# Patient Record
Sex: FEMALE | URBAN - METROPOLITAN AREA
[De-identification: names, ages, dates, MRNs, and addresses within clinical notes are randomized per-mention and may not be internally consistent; named-entity substitution may affect disease eponyms.]

---

## 2024-08-12 PROCEDURE — 0753T DGTZ GLS MCRSCP SLD LEVEL IV: CPT

## 2024-08-12 PROCEDURE — 88305 TISSUE EXAM BY PATHOLOGIST: CPT

## 2024-08-13 ENCOUNTER — LAB REQUISITION (OUTPATIENT)
Dept: LAB | Facility: HOSPITAL | Age: 54
End: 2024-08-13

## 2024-08-13 DIAGNOSIS — R10.84 GENERALIZED ABDOMINAL PAIN: ICD-10-CM

## 2024-08-19 LAB
LABORATORY COMMENT REPORT: NORMAL
PATH REPORT.FINAL DX SPEC: NORMAL
PATH REPORT.GROSS SPEC: NORMAL
PATH REPORT.TOTAL CANCER: NORMAL

## 2024-12-03 ENCOUNTER — TELEPHONE (OUTPATIENT)
Dept: GASTROENTEROLOGY | Facility: CLINIC | Age: 54
End: 2024-12-03
Payer: COMMERCIAL

## 2024-12-03 NOTE — TELEPHONE ENCOUNTER
Called patient to set up an appointment with Dr King, per a faxed referral that was sent over on 12/03/2024

## 2025-02-17 ASSESSMENT — ENCOUNTER SYMPTOMS
MUSCULOSKELETAL NEGATIVE: 1
RESPIRATORY NEGATIVE: 1
ALLERGIC/IMMUNOLOGIC NEGATIVE: 1
NEUROLOGICAL NEGATIVE: 1
HEMATOLOGIC/LYMPHATIC NEGATIVE: 1
GASTROINTESTINAL NEGATIVE: 1
ENDOCRINE NEGATIVE: 1
CARDIOVASCULAR NEGATIVE: 1
PSYCHIATRIC NEGATIVE: 1
EYES NEGATIVE: 1

## 2025-02-17 NOTE — PROGRESS NOTES
Subjective     History of Present Illness:   Rissa Diego is a 54 y.o. female who presents to GI clinic for abdominal pain.  She was referred to us by Dr Monet    She was first diagnosed with gastroparesis several years ago.  At the time she was having nausea and vomiting and her symptoms got better but over the last 6 months to a year her symptoms of gotten really worse.  The main symptom include significant weight loss.   She also has been having constant abdominal pain  which is exacerbated by eating. She also has been having abdominal tightening. She notes a weight loss of 56 lbs since June 2024. She mentions that she burps undigested food.  For example she can eat something and then burp at 3 days later.  She has been having problems for quite some time and saw GI.  She had an endoscopy in August that was told she was normal.  She had a gastric emptying test 2 years ago that showed gastroparesis.  She most recently was having significant problems and she went to the GI doctors that were seeing her and demanded to be admitted to the hospital.  She was admitted to the hospital and had testing including an upper GI with small bowel follow-through that showed SMA syndrome and delayed gastric emptying.  She was told that she has SMA syndrome and will follow-up with GI.       She has tried Reglan and erythromycin with minimal relief.. Patient works as a nurse.     Review of Systems  Review of Systems   Constitutional:  Positive for unexpected weight change.   HENT: Negative.     Eyes: Negative.    Respiratory: Negative.     Cardiovascular: Negative.    Gastrointestinal: Negative.    Endocrine: Negative.    Genitourinary: Negative.    Musculoskeletal: Negative.    Skin: Negative.    Allergic/Immunologic: Negative.    Neurological: Negative.    Hematological: Negative.    Psychiatric/Behavioral: Negative.         Past Medical History   has no past medical history on file.     Social History   reports that she has  "been smoking cigarettes. She started smoking about 25 years ago. She has a 37.5 pack-year smoking history. She has never used smokeless tobacco. She reports current alcohol use. She reports that she does not use drugs.     Family History  family history includes Melanoma in her father.     Allergies  Allergies   Allergen Reactions    Etanercept Anaphylaxis    Sulfa (Sulfonamide Antibiotics) Hives, Rash and Shortness of breath    Duloxetine Unknown    Amoxicillin Hives, Other and Rash       Medications  Current Outpatient Medications   Medication Instructions    ALPRAZolam (XANAX) 0.25 mg, As needed    magnesium hydroxide (Milk of Magnesia) 400 mg/5 mL suspension 45 mL, Daily PRN       Objective   /87 (BP Location: Left arm, Patient Position: Sitting)   Pulse 102   Ht 1.651 m (5' 5\")   Wt 53.3 kg (117 lb 9.6 oz)   BMI 19.57 kg/m²   Physical Exam  Constitutional:       Comments: Very thin appearing lady.   HENT:      Head: Normocephalic and atraumatic.      Right Ear: Tympanic membrane normal.      Left Ear: Tympanic membrane normal.      Nose: Nose normal.      Mouth/Throat:      Mouth: Mucous membranes are moist.   Eyes:      Extraocular Movements: Extraocular movements intact.      Pupils: Pupils are equal, round, and reactive to light.   Cardiovascular:      Rate and Rhythm: Normal rate and regular rhythm.      Pulses: Normal pulses.      Heart sounds: Normal heart sounds.   Pulmonary:      Effort: Pulmonary effort is normal.      Breath sounds: Normal breath sounds.   Abdominal:      General: Abdomen is flat. Bowel sounds are normal. There is no distension.      Palpations: Abdomen is soft.      Tenderness: There is no abdominal tenderness.   Musculoskeletal:         General: Normal range of motion.      Cervical back: Normal range of motion.   Skin:     General: Skin is warm.   Neurological:      General: No focal deficit present.   Psychiatric:         Mood and Affect: Mood normal. "         Assessment/Plan   Rissa Diego is a 54 y.o. female who presents to GI clinic for abdominal pain and gastroparesis.    It appears that she has idiopathic gastroparesis with current worsening symptoms.  Severe weight loss.  I think the SMA syndrome is caused by the gastroparesis however it is probably causing her symptoms to get worse especially gastroparesis symptoms.  She is really not having nausea and vomiting so I do not think that medical therapy is indicated.    This patient will need some sort of surgical therapy.  She may even need a feeding tube like a J-tube  I will refer her to our surgeons (Dr Ron or Dr Iglesias )to discuss different options which could include either a strong procedure for SMA syndrome or G-POEM   Attempted to answer he questions  There is no role for medical therapy     By signing my name below, I, Mingo Alcantara attest that this documentation has been prepared under the direction and in the presence of Jesus King MD

## 2025-02-18 ENCOUNTER — APPOINTMENT (OUTPATIENT)
Dept: GASTROENTEROLOGY | Facility: CLINIC | Age: 55
End: 2025-02-18
Payer: COMMERCIAL

## 2025-02-18 VITALS
DIASTOLIC BLOOD PRESSURE: 87 MMHG | HEIGHT: 65 IN | WEIGHT: 117.6 LBS | BODY MASS INDEX: 19.59 KG/M2 | HEART RATE: 102 BPM | SYSTOLIC BLOOD PRESSURE: 126 MMHG

## 2025-02-18 DIAGNOSIS — K31.84 GASTROPARESIS: Primary | ICD-10-CM

## 2025-02-18 DIAGNOSIS — K55.1 SMAS (SUPERIOR MESENTERIC ARTERY SYNDROME) (MULTI): ICD-10-CM

## 2025-02-18 PROCEDURE — 3008F BODY MASS INDEX DOCD: CPT | Performed by: INTERNAL MEDICINE

## 2025-02-18 PROCEDURE — 99205 OFFICE O/P NEW HI 60 MIN: CPT | Performed by: INTERNAL MEDICINE

## 2025-02-18 RX ORDER — ALPRAZOLAM 0.25 MG/1
0.25 TABLET ORAL AS NEEDED
COMMUNITY

## 2025-02-18 RX ORDER — ADHESIVE BANDAGE
45 BANDAGE TOPICAL DAILY PRN
COMMUNITY

## 2025-02-18 ASSESSMENT — ENCOUNTER SYMPTOMS: UNEXPECTED WEIGHT CHANGE: 1

## 2025-02-18 NOTE — PATIENT INSTRUCTIONS
This patient will need some sort of surgical therapy.  She may even need a feeding tube like a J-tube  I will refer her to our surgeons (Dr Ron or Dr Iglesias )to discuss different options which could include either a strong procedure for SMA syndrome or G-POEM   Attempted to answer he questions  There is no role for medical therapy

## 2025-03-07 ENCOUNTER — APPOINTMENT (OUTPATIENT)
Dept: SURGERY | Facility: CLINIC | Age: 55
End: 2025-03-07
Payer: COMMERCIAL

## 2025-03-07 VITALS
WEIGHT: 121 LBS | SYSTOLIC BLOOD PRESSURE: 148 MMHG | RESPIRATION RATE: 16 BRPM | HEART RATE: 92 BPM | DIASTOLIC BLOOD PRESSURE: 100 MMHG | BODY MASS INDEX: 20.16 KG/M2 | HEIGHT: 65 IN

## 2025-03-07 DIAGNOSIS — K31.84 GASTROPARESIS: ICD-10-CM

## 2025-03-07 DIAGNOSIS — K55.1 SMAS (SUPERIOR MESENTERIC ARTERY SYNDROME) (MULTI): ICD-10-CM

## 2025-03-07 PROCEDURE — 99204 OFFICE O/P NEW MOD 45 MIN: CPT | Performed by: STUDENT IN AN ORGANIZED HEALTH CARE EDUCATION/TRAINING PROGRAM

## 2025-03-07 PROCEDURE — 3008F BODY MASS INDEX DOCD: CPT | Performed by: STUDENT IN AN ORGANIZED HEALTH CARE EDUCATION/TRAINING PROGRAM

## 2025-03-07 ASSESSMENT — PAIN SCALES - GENERAL: PAINLEVEL_OUTOF10: 4

## 2025-03-07 NOTE — PROGRESS NOTES
"History Of Present Illness  Patient is a 54 y.o. female who presents to discuss further workup and/or intervention for her gastroparesis.  Interestingly, her main reported symptom is the feeling of food and pills getting \"stuck in the stomach\".  She denies any nausea, vomiting, regurgitation, dysphagia, reflux.  She does endorse some mild associated abdominal pain and some bloating.  However, she has lost a significant amount of weight with a current BMI of 20.  Her symptoms became increasingly worse and therefore she presented to the emergency department where a CT scan was obtained which showed concerns with for possible SMA syndrome.  She was treated as an inpatient and has since gained a little bit of weight, however has now plateaued and remains with the above symptoms.  Reportedly she had a abnormal gastric emptying study years ago however no interventions have been performed.  She has tried Reglan and other medications with minimal effect.  Her last endoscopy was last August and was normal.     Past Medical History  No past medical history on file.    Surgical History  No past surgical history on file.     Social History  She reports that she has been smoking cigarettes. She started smoking about 25 years ago. She has a 37.5 pack-year smoking history. She has been exposed to tobacco smoke. She has never used smokeless tobacco. She reports current alcohol use. She reports that she does not use drugs.    Family History  Family History   Problem Relation Name Age of Onset    Melanoma Father          Allergies  Etanercept, Sulfa (sulfonamide antibiotics), Duloxetine, and Amoxicillin    Review of Systems  - CONSTITUTIONAL: Denies fever and chills.  - HEENT: Denies changes in vision and hearing.  - RESPIRATORY: Denies SOB and cough.  - CV: Denies palpitations and CP.  - GI: see HPI  - : Denies dysuria and urinary frequency.  - MSK: Denies myalgia and joint pain.  - SKIN: Denies rash and pruritus.  - NEUROLOGICAL: " "Denies headache and syncope.  - PSYCHIATRIC: Denies recent changes in mood. Denies anxiety and depression.     Physical Exam  - GENERAL: Alert and oriented x 3. No acute distress.  - EYES: EOMI. Anicteric.  - HENT: Moist mucous membranes. No scleral icterus. No cervical lymphadenopathy.  - LUNGS: Breathing comfortably on room air. No accessory muscle use, no distress.  - CARDIOVASCULAR: Regular rate and rhythm. No murmur. No JVD.  - ABDOMEN: Soft, non-tender and non-distended. No palpable masses.  - EXTREMITIES: No edema. Non-tender.  - SKIN: No rashes or lesions. Warm.  - NEUROLOGIC: No focal neurological deficits. CN II-XII grossly intact, but not individually tested.  - PSYCHIATRIC: Cooperative. Appropriate mood and affect.    Last Recorded Vitals  Blood pressure (!) 148/100, pulse 92, resp. rate 16, height 1.651 m (5' 5\"), weight 54.9 kg (121 lb).    Relevant Results  No results found.     Assessment and Plan  Patient is a 54 y.o. female who presents to discuss further workup intervention for her gastroparesis and recent weight loss.  Her symptoms are certainly somewhat atypical however could certainly be due to her previously diagnosed gastroparesis.  However, since her last gastric emptying study was remote, we will plan to repeat the gastric emptying study.  We briefly described the etiology, pathophysiology, natural course, various treatment options for gastroparesis.  If her gastric emptying study is abnormal, we will likely proceed with a peroral pyloromyotomy.  If negative, then we would proceed with further foregut workups.  In the meantime I encouraged her to continue to focus on her nutrition with high caloric intake with a focus on protein.  Her questions were answered at this time she be followed up after the above results.    Ke Ron MD    "

## 2025-03-18 ENCOUNTER — HOSPITAL ENCOUNTER (OUTPATIENT)
Dept: RADIOLOGY | Facility: HOSPITAL | Age: 55
Discharge: HOME | End: 2025-03-18
Payer: COMMERCIAL

## 2025-03-18 DIAGNOSIS — K31.84 GASTROPARESIS: ICD-10-CM

## 2025-03-18 PROCEDURE — 3430000001 HC RX 343 DIAGNOSTIC RADIOPHARMACEUTICALS: Performed by: RADIOLOGY

## 2025-03-18 PROCEDURE — 78264 GASTRIC EMPTYING IMG STUDY: CPT

## 2025-03-18 PROCEDURE — A9541 TC99M SULFUR COLLOID: HCPCS | Performed by: RADIOLOGY

## 2025-03-18 RX ADMIN — TECHNETIUM TC 99M SULFUR COLLOID 1 MILLICURIE: KIT at 08:55

## 2025-03-21 ENCOUNTER — TELEMEDICINE (OUTPATIENT)
Dept: SURGERY | Facility: CLINIC | Age: 55
End: 2025-03-21
Payer: COMMERCIAL

## 2025-03-21 DIAGNOSIS — K31.84 GASTROPARESIS: Primary | ICD-10-CM

## 2025-03-21 PROCEDURE — 99212 OFFICE O/P EST SF 10 MIN: CPT | Performed by: STUDENT IN AN ORGANIZED HEALTH CARE EDUCATION/TRAINING PROGRAM

## 2025-03-21 NOTE — PROGRESS NOTES
A telephone visit (audio only) between the patient and the provider was utilized to provide this telehealth service.  Verbal consent was requested and obtained from the patient on this date, 03/21/25, for a telehealth visit. This communication lasted 6 minutes.    Patient presents for a virtual appointment to discuss the results of her recent gastric emptying study and to discuss any further workup and/or possible interventions.  We did review the results of her emptying study which showed normal gastric emptying of solids at each interval from 1 to 4 hours.  However, she still describes intermittent episodes of vomiting, bloating, and issues with p.o. intake.  We discussed both a watchful waiting approach and further workup with EGD and high-resolution manometry.  After further discussion the patient wishes to proceed with EGD and HRM to evaluate for any possible esophageal pathologies and or etiologies of her symptoms.  Her questions were answered at this time she be scheduled for the above procedure in the near future.

## 2025-04-01 ENCOUNTER — HOSPITAL ENCOUNTER (OUTPATIENT)
Dept: GASTROENTEROLOGY | Facility: HOSPITAL | Age: 55
Discharge: HOME | End: 2025-04-01
Payer: COMMERCIAL

## 2025-04-01 ENCOUNTER — ANESTHESIA (OUTPATIENT)
Dept: GASTROENTEROLOGY | Facility: HOSPITAL | Age: 55
End: 2025-04-01
Payer: COMMERCIAL

## 2025-04-01 ENCOUNTER — ANESTHESIA EVENT (OUTPATIENT)
Dept: GASTROENTEROLOGY | Facility: HOSPITAL | Age: 55
End: 2025-04-01
Payer: COMMERCIAL

## 2025-04-01 VITALS
TEMPERATURE: 97.9 F | WEIGHT: 113 LBS | BODY MASS INDEX: 18.83 KG/M2 | HEIGHT: 65 IN | RESPIRATION RATE: 18 BRPM | SYSTOLIC BLOOD PRESSURE: 116 MMHG | DIASTOLIC BLOOD PRESSURE: 88 MMHG | OXYGEN SATURATION: 97 % | HEART RATE: 107 BPM

## 2025-04-01 DIAGNOSIS — R11.10: ICD-10-CM

## 2025-04-01 DIAGNOSIS — R14.0 ABDOMINAL BLOATING: ICD-10-CM

## 2025-04-01 DIAGNOSIS — R63.4 WEIGHT LOSS: ICD-10-CM

## 2025-04-01 PROBLEM — K21.9 GASTROESOPHAGEAL REFLUX DISEASE: Status: ACTIVE | Noted: 2025-04-01

## 2025-04-01 PROCEDURE — A43235 PR ESOPHAGOGASTRODUODENOSCOPY TRANSORAL DIAGNOSTIC

## 2025-04-01 PROCEDURE — 7100000009 HC PHASE TWO TIME - INITIAL BASE CHARGE: Performed by: STUDENT IN AN ORGANIZED HEALTH CARE EDUCATION/TRAINING PROGRAM

## 2025-04-01 PROCEDURE — A43235 PR ESOPHAGOGASTRODUODENOSCOPY TRANSORAL DIAGNOSTIC: Performed by: ANESTHESIOLOGY

## 2025-04-01 PROCEDURE — 3700000001 HC GENERAL ANESTHESIA TIME - INITIAL BASE CHARGE: Performed by: STUDENT IN AN ORGANIZED HEALTH CARE EDUCATION/TRAINING PROGRAM

## 2025-04-01 PROCEDURE — 2500000004 HC RX 250 GENERAL PHARMACY W/ HCPCS (ALT 636 FOR OP/ED)

## 2025-04-01 PROCEDURE — 7100000010 HC PHASE TWO TIME - EACH INCREMENTAL 1 MINUTE: Performed by: STUDENT IN AN ORGANIZED HEALTH CARE EDUCATION/TRAINING PROGRAM

## 2025-04-01 PROCEDURE — 91010 ESOPHAGUS MOTILITY STUDY: CPT

## 2025-04-01 PROCEDURE — 43235 EGD DIAGNOSTIC BRUSH WASH: CPT | Performed by: STUDENT IN AN ORGANIZED HEALTH CARE EDUCATION/TRAINING PROGRAM

## 2025-04-01 PROCEDURE — 3700000002 HC GENERAL ANESTHESIA TIME - EACH INCREMENTAL 1 MINUTE: Performed by: STUDENT IN AN ORGANIZED HEALTH CARE EDUCATION/TRAINING PROGRAM

## 2025-04-01 RX ORDER — ONDANSETRON HYDROCHLORIDE 2 MG/ML
4 INJECTION, SOLUTION INTRAVENOUS ONCE AS NEEDED
Status: DISCONTINUED | OUTPATIENT
Start: 2025-04-01 | End: 2025-04-02 | Stop reason: HOSPADM

## 2025-04-01 RX ORDER — PROPOFOL 10 MG/ML
INJECTION, EMULSION INTRAVENOUS AS NEEDED
Status: DISCONTINUED | OUTPATIENT
Start: 2025-04-01 | End: 2025-04-01

## 2025-04-01 RX ORDER — SODIUM CHLORIDE, SODIUM LACTATE, POTASSIUM CHLORIDE, CALCIUM CHLORIDE 600; 310; 30; 20 MG/100ML; MG/100ML; MG/100ML; MG/100ML
100 INJECTION, SOLUTION INTRAVENOUS CONTINUOUS
Status: DISCONTINUED | OUTPATIENT
Start: 2025-04-01 | End: 2025-04-02 | Stop reason: HOSPADM

## 2025-04-01 RX ORDER — LIDOCAINE HYDROCHLORIDE 20 MG/ML
INJECTION, SOLUTION INFILTRATION; PERINEURAL AS NEEDED
Status: DISCONTINUED | OUTPATIENT
Start: 2025-04-01 | End: 2025-04-01

## 2025-04-01 RX ORDER — MIDAZOLAM HYDROCHLORIDE 1 MG/ML
INJECTION INTRAMUSCULAR; INTRAVENOUS AS NEEDED
Status: DISCONTINUED | OUTPATIENT
Start: 2025-04-01 | End: 2025-04-01

## 2025-04-01 RX ORDER — LIDOCAINE IN NACL,ISO-OSMOT/PF 30 MG/3 ML
0.1 SYRINGE (ML) INJECTION ONCE
Status: DISCONTINUED | OUTPATIENT
Start: 2025-04-01 | End: 2025-04-02 | Stop reason: HOSPADM

## 2025-04-01 RX ORDER — ACETAMINOPHEN 325 MG/1
650 TABLET ORAL EVERY 4 HOURS PRN
Status: DISCONTINUED | OUTPATIENT
Start: 2025-04-01 | End: 2025-04-02 | Stop reason: HOSPADM

## 2025-04-01 RX ADMIN — PROPOFOL 40 MG: 10 INJECTION, EMULSION INTRAVENOUS at 12:46

## 2025-04-01 RX ADMIN — MIDAZOLAM HYDROCHLORIDE 2 MG: 2 INJECTION, SOLUTION INTRAMUSCULAR; INTRAVENOUS at 12:42

## 2025-04-01 RX ADMIN — SODIUM CHLORIDE, SODIUM LACTATE, POTASSIUM CHLORIDE, AND CALCIUM CHLORIDE: 600; 310; 30; 20 INJECTION, SOLUTION INTRAVENOUS at 12:42

## 2025-04-01 RX ADMIN — LIDOCAINE HYDROCHLORIDE 60 MG: 20 INJECTION, SOLUTION INFILTRATION; PERINEURAL at 12:46

## 2025-04-01 RX ADMIN — PROPOFOL 200 MCG/KG/MIN: 10 INJECTION, EMULSION INTRAVENOUS at 12:47

## 2025-04-01 ASSESSMENT — PAIN - FUNCTIONAL ASSESSMENT
PAIN_FUNCTIONAL_ASSESSMENT: 0-10

## 2025-04-01 ASSESSMENT — COLUMBIA-SUICIDE SEVERITY RATING SCALE - C-SSRS
1. IN THE PAST MONTH, HAVE YOU WISHED YOU WERE DEAD OR WISHED YOU COULD GO TO SLEEP AND NOT WAKE UP?: NO
2. HAVE YOU ACTUALLY HAD ANY THOUGHTS OF KILLING YOURSELF?: NO
6. HAVE YOU EVER DONE ANYTHING, STARTED TO DO ANYTHING, OR PREPARED TO DO ANYTHING TO END YOUR LIFE?: NO

## 2025-04-01 ASSESSMENT — PAIN SCALES - GENERAL
PAINLEVEL_OUTOF10: 0 - NO PAIN
PAINLEVEL_OUTOF10: 4
PAINLEVEL_OUTOF10: 0 - NO PAIN
PAINLEVEL_OUTOF10: 0 - NO PAIN

## 2025-04-01 NOTE — DISCHARGE INSTRUCTIONS
The anesthetics, sedatives, or narcotics which were given to you today will be acting in your body for the next 24 hours, so you might feel sleepy or groggy. The feeling should slowly wear off. Carefully read and follow the instructions below:    Do not drive or operate any machinery or power tools of any kind  No alcoholic beverages today, not even beer or wine  Do not make any important decisions or sign any legal documents  No over the counter medications that contain alcohol or may cause drowsiness  Start with a liquid diet and advance your diet as tolerated, working up to eating solids  Avoid nuts, beans, lettuce, red meats, and fried foods for the next 24 hrs    While it is common to experience mild to moderate abdominal distension, gas or belching after your procedure, If any of these symptoms occur following discharge from the GI Lab or within one week of having your procedure, call the [facility] to be advised whether a visit to your nearest Urgent Care or Emergency Department is indicated. Take this paper with you if you go:    - If you develop an allergic reaction to the medications that were given during your procedure such as difficulty breathing, rash, hives, severe nausea, vomiting or lightheadedness.  - If you experience chest pain, shortness of breath, severe abdominal pain, fevers and chills  - If you develop signs and symptoms of bleeding such as blood in your spit, if your stools turn black, tarry or bloody  - If you have not urinated within 8 hrs following your procedure  - If your IV site becomes painful, red, inflamed, or looks infected    If you received a biopsy/polypectomy/sphincterotomy  - Check with your physician before resuming Aspirin, non-steroidal medications or anti-coagulants    If any biopsies were obtained during your procedure, the results will come back in 7-10 business days. Results can be found in your MyChart and be sure to follow up with your ordering  provider.    If you experience any problems or have any questions following discharge from GI Lab, please call:    [phone] (before 5pm)  [phone] and ask for the GI Fellow on Call (after 5pm)

## 2025-04-01 NOTE — ANESTHESIA PREPROCEDURE EVALUATION
Patient: Rissa Diego    Procedure Information       Anesthesia Start Date/Time: 04/01/25 1242    Scheduled providers: Ke Ron MD    Procedure: EGD    Location: [facility]            Relevant Problems   GI   (+) Gastroesophageal reflux disease       Clinical information reviewed:   Tobacco  Allergies  Meds   Med Hx  Surg Hx   Fam Hx  Soc Hx        NPO Detail:  NPO/Void Status  Date of Last Liquid: 04/01/25  Time of Last Liquid: 0000  Date of Last Solid: 03/31/25  Time of Last Solid: 1600  Last Intake Type: Clear fluids         Physical Exam    Airway  Mallampati: II  TM distance: >3 FB     Cardiovascular   Rhythm: regular  Rate: normal     Dental - normal exam     Pulmonary   Breath sounds clear to auscultation     Abdominal   Abdomen: soft         Anesthesia Plan    History of general anesthesia?: yes  History of complications of general anesthesia?: no    ASA 2     MAC     intravenous induction   Anesthetic plan and risks discussed with patient.    Plan discussed with CRNA.

## 2025-04-01 NOTE — NURSING NOTE
Esophageal Manometry Test    Referring Provider: Ke Ron MD  [id number]  [place] [address]    Indication: Abdominal bloating [R14.0]     Symptoms: Dysphagia    Age: 54 y.o.    After standard calibration of EMOT catheter and educating patient, placed EMOT catheter through right nares during EGD to a sufficient depth.  Moderate epistaxis after catheter was placed. Resolved with pressure. After patient sufficiently recovered, proceeded with EMOT study and completed all studies per protocol.  Extubated EMOT catheter intact.  Discharged patient to home in stable condition without complaint.

## 2025-04-01 NOTE — ANESTHESIA POSTPROCEDURE EVALUATION
Patient: Rissa Diego    Procedure Summary       Date: 04/01/25 Room / Location: [facility]    Anesthesia Start: 1242 Anesthesia Stop: 1303    Procedure: EGD Diagnosis:       Excessive vomiting      Abdominal bloating      Weight loss    Scheduled Providers: Ke Ron MD Responsible Provider: Ori Rucker MD    Anesthesia Type: MAC ASA Status: 2            Anesthesia Type: MAC    Vitals Value Taken Time   /78 04/01/25 1302   Temp 36.6 °C (97.9 °F) 04/01/25 1302   Pulse 114 04/01/25 1302   Resp 19 04/01/25 1302   SpO2 96 % 04/01/25 1302       Anesthesia Post Evaluation    Patient location during evaluation: PACU  Patient participation: complete - patient participated  Level of consciousness: awake  Pain management: adequate  Airway patency: patent  Cardiovascular status: acceptable  Respiratory status: acceptable  Hydration status: acceptable  Postoperative Nausea and Vomiting: none      There were no known notable events for this encounter.

## 2025-05-16 ENCOUNTER — APPOINTMENT (OUTPATIENT)
Dept: SURGERY | Facility: CLINIC | Age: 55
End: 2025-05-16
Payer: COMMERCIAL

## 2025-05-16 VITALS
HEART RATE: 102 BPM | DIASTOLIC BLOOD PRESSURE: 102 MMHG | RESPIRATION RATE: 16 BRPM | SYSTOLIC BLOOD PRESSURE: 158 MMHG | BODY MASS INDEX: 20.99 KG/M2 | WEIGHT: 126 LBS | HEIGHT: 65 IN

## 2025-05-16 DIAGNOSIS — K55.1 SMAS (SUPERIOR MESENTERIC ARTERY SYNDROME) (MULTI): Primary | ICD-10-CM

## 2025-05-16 PROCEDURE — 99213 OFFICE O/P EST LOW 20 MIN: CPT | Performed by: STUDENT IN AN ORGANIZED HEALTH CARE EDUCATION/TRAINING PROGRAM

## 2025-05-16 PROCEDURE — 3008F BODY MASS INDEX DOCD: CPT | Performed by: STUDENT IN AN ORGANIZED HEALTH CARE EDUCATION/TRAINING PROGRAM

## 2025-05-16 ASSESSMENT — PAIN SCALES - GENERAL: PAINLEVEL_OUTOF10: 4

## 2025-05-16 NOTE — PROGRESS NOTES
History Of Present Illness  Patient is a 54 y.o. female who presents to discuss further workup and/or intervention for her chronic abdominal pain and intermittent bloating.  She did have a repeated gastric emptying study which was normal.  She also recently underwent high-resolution manometry which was also overall normal and does not explain her symptomatology.  There has also been concern for SMA syndrome on CT and upper GI.  Her main complaint is pain especially with eating and is limiting her oral intake to foods that do not seem to cause her any issues.  She is trying her best to gain weight with additional supplements shakes, high calorie diet.  She is gaining a little weight however this is still been a struggle given the above.  She is also dealing with moderate to severe constipation.  She has previously taken high doses of milk of mag however she has stopped that now due to electrolyte imbalances and history of a solitary kidney.  She is attempting some Colace but this does not seem to help with much.     Past Medical History  No past medical history on file.    Surgical History  Past Surgical History:  Procedure Laterality Date    OTHER SURGICAL HISTORY Left     left kidney removal    OTHER SURGICAL HISTORY      multiple excisions of endometriosis        Social History  She reports that she has been smoking cigarettes. She started smoking about 25 years ago. She has a 38.1 pack-year smoking history. She has been exposed to tobacco smoke. She has never used smokeless tobacco. She reports current alcohol use. She reports that she does not use drugs.    Family History  Family History[1]     Allergies  Etanercept, Sulfa (sulfonamide antibiotics), Corn, Duloxetine, and Amoxicillin    Review of Systems  - CONSTITUTIONAL: Denies fever and chills.  - RESPIRATORY: Denies SOB and cough.  - CV: Denies palpitations and CP.  - GI: see HPI  - : Denies dysuria and urinary frequency.       Physical Exam  - GENERAL:  "Alert and oriented x 3. No acute distress. Well-nourished.  - EYES: EOMI. Anicteric.  - HENT: Moist mucous membranes. No scleral icterus. No cervical lymphadenopathy.  - LUNGS: Breathing comfortably on room air. No accessory muscle use, no distress.  - CARDIOVASCULAR: Regular rate and rhythm. No murmur. No JVD.  - ABDOMEN: Soft, minimally tender in the periumbilical region and non-distended. No palpable masses.  - EXTREMITIES: No edema. Non-tender.  - SKIN: No rashes or lesions. Warm.  - NEUROLOGIC: No focal neurological deficits. CN II-XII grossly intact, but not individually tested.  - PSYCHIATRIC: Cooperative. Appropriate mood and affect.    Last Recorded Vitals  Blood pressure (!) 158/102, pulse 102, resp. rate 16, height 1.651 m (5' 5\"), weight 57.2 kg (126 lb).    Relevant Results  No results found.     Assessment and Plan  Patient is a 54 y.o. female who presents to discuss further workup and/or possible intervention for her ongoing chronic abdominal pain in the setting of reported SMA syndrome on CT scan and upper GI.  She is attempting to gain weight however this is proved to be difficult given her symptoms.  We again discussed the importance of weight gain in the setting of possible SMA syndrome as restarting the aortomesenteric fat pad should greatly improve her symptoms.  However she is not able to significantly gain weight.  Therefore we discussed the possibility of a percutaneous endoscopic jejunostomy tube placement.  We discussed that this would be temporary and could either be a standard tube type feeding tube versus a button.  Finally, we did discuss that when she is able to gain some weight that we would repeat her SMA workup to fully evaluate the aortomesenteric angle.  She will discuss the above recommendations with her family and give it some thought.  She will contact our office if she wishes to proceed with the percutaneous endoscopic jejunostomy tube placement.  Finally, I did recommend " starting low-dose MiraLAX with up titration as needed for her constipation.    Ke Ron MD         [1]   Family History  Problem Relation Name Age of Onset    Melanoma Father

## 2025-07-28 ENCOUNTER — APPOINTMENT (OUTPATIENT)
Dept: SURGERY | Facility: CLINIC | Age: 55
End: 2025-07-28
Payer: COMMERCIAL

## 2025-07-28 DIAGNOSIS — R10.9 CHRONIC ABDOMINAL PAIN: Primary | ICD-10-CM

## 2025-07-28 DIAGNOSIS — G89.29 CHRONIC ABDOMINAL PAIN: Primary | ICD-10-CM

## 2025-07-28 PROCEDURE — 99213 OFFICE O/P EST LOW 20 MIN: CPT | Performed by: STUDENT IN AN ORGANIZED HEALTH CARE EDUCATION/TRAINING PROGRAM

## 2025-07-29 DIAGNOSIS — R10.9 CHRONIC ABDOMINAL PAIN: Primary | ICD-10-CM

## 2025-07-29 DIAGNOSIS — G89.29 CHRONIC ABDOMINAL PAIN: Primary | ICD-10-CM

## 2025-07-29 NOTE — PROGRESS NOTES
A telephone visit (audio only) between the patient and the provider was utilized to provide this telehealth service.  Verbal consent was requested and obtained from the patient on this date, 07/28/25, for a telehealth visit. This communication lasted 10 minutes.    Assessment and Plan  Patient is a 54 y.o. female who presents for a virtual visit to discuss her ongoing issues with abdominal pain, bloating, constipation.  There has been some concern on some past imaging of SMA syndrome which could explain some of her symptomatology, however certainly some of her issues would be unexplained by SMA syndrome entirely.  Therefore, to further workup the above as well as for other vascular etiologies, we discussed the utility of obtaining a CTA of the abdomen pelvis to fully evaluate the abdominal vasculature for any pathology to explain her symptoms.  She is continuing to gain some weight, however slowly, and therefore as we discussed previously if her she were to gain some weight a CTA would be obtained to further delineate her vasculature.  Finally, we discussed that if the CTA was overall normal without significant findings, that a referral will be made to a functional GI specialist for consideration of a possible functional GI etiology.  Her questions were answered at this time should be followed up with a virtual visit after the above imaging has been reviewed.

## 2025-08-02 LAB
ANION GAP SERPL CALCULATED.4IONS-SCNC: 11 MMOL/L (CALC) (ref 7–17)
BUN SERPL-MCNC: 19 MG/DL (ref 7–25)
BUN/CREAT SERPL: NORMAL (CALC) (ref 6–22)
CALCIUM SERPL-MCNC: 10.2 MG/DL (ref 8.6–10.4)
CHLORIDE SERPL-SCNC: 102 MMOL/L (ref 98–110)
CO2 SERPL-SCNC: 27 MMOL/L (ref 20–32)
CREAT SERPL-MCNC: 0.54 MG/DL (ref 0.5–1.03)
EGFRCR SERPLBLD CKD-EPI 2021: 111 ML/MIN/1.73M2
GLUCOSE SERPL-MCNC: 82 MG/DL (ref 65–139)
POTASSIUM SERPL-SCNC: 4.6 MMOL/L (ref 3.5–5.3)
SODIUM SERPL-SCNC: 140 MMOL/L (ref 135–146)

## 2025-08-04 ENCOUNTER — HOSPITAL ENCOUNTER (OUTPATIENT)
Dept: RADIOLOGY | Facility: CLINIC | Age: 55
Discharge: HOME | End: 2025-08-04
Payer: COMMERCIAL

## 2025-08-04 DIAGNOSIS — G89.29 CHRONIC ABDOMINAL PAIN: ICD-10-CM

## 2025-08-04 DIAGNOSIS — R10.9 CHRONIC ABDOMINAL PAIN: ICD-10-CM

## 2025-08-04 PROCEDURE — 74175 CTA ABDOMEN W/CONTRAST: CPT

## 2025-08-04 PROCEDURE — 2550000001 HC RX 255 CONTRASTS: Performed by: STUDENT IN AN ORGANIZED HEALTH CARE EDUCATION/TRAINING PROGRAM

## 2025-08-04 PROCEDURE — 74175 CTA ABDOMEN W/CONTRAST: CPT | Performed by: STUDENT IN AN ORGANIZED HEALTH CARE EDUCATION/TRAINING PROGRAM

## 2025-08-04 RX ADMIN — IOHEXOL 69 ML: 350 INJECTION, SOLUTION INTRAVENOUS at 13:25

## 2025-08-06 ENCOUNTER — APPOINTMENT (OUTPATIENT)
Dept: SURGERY | Facility: CLINIC | Age: 55
End: 2025-08-06
Payer: COMMERCIAL

## 2025-08-20 ENCOUNTER — APPOINTMENT (OUTPATIENT)
Dept: GASTROENTEROLOGY | Facility: CLINIC | Age: 55
End: 2025-08-20
Payer: COMMERCIAL

## 2025-08-20 VITALS
DIASTOLIC BLOOD PRESSURE: 90 MMHG | BODY MASS INDEX: 22.16 KG/M2 | SYSTOLIC BLOOD PRESSURE: 125 MMHG | WEIGHT: 133 LBS | HEIGHT: 65 IN | HEART RATE: 90 BPM

## 2025-08-20 DIAGNOSIS — K31.84 GASTROPARESIS: ICD-10-CM

## 2025-08-20 DIAGNOSIS — K44.9 HIATAL HERNIA: ICD-10-CM

## 2025-08-20 DIAGNOSIS — R13.10 DYSPHAGIA, UNSPECIFIED TYPE: ICD-10-CM

## 2025-08-20 DIAGNOSIS — K59.09 OTHER CONSTIPATION: ICD-10-CM

## 2025-08-20 DIAGNOSIS — K21.9 GASTROESOPHAGEAL REFLUX DISEASE WITHOUT ESOPHAGITIS: ICD-10-CM

## 2025-08-20 DIAGNOSIS — Z12.11 COLON CANCER SCREENING: Primary | ICD-10-CM

## 2025-08-20 PROCEDURE — 99215 OFFICE O/P EST HI 40 MIN: CPT | Performed by: NURSE PRACTITIONER

## 2025-08-20 PROCEDURE — 3008F BODY MASS INDEX DOCD: CPT | Performed by: NURSE PRACTITIONER

## 2025-08-20 RX ORDER — METOPROLOL SUCCINATE 25 MG/1
TABLET, EXTENDED RELEASE ORAL
COMMUNITY
Start: 2025-06-10

## 2025-08-20 RX ORDER — SODIUM, POTASSIUM,MAG SULFATES 17.5-3.13G
1 SOLUTION, RECONSTITUTED, ORAL ORAL 2 TIMES DAILY
Qty: 1 EACH | Refills: 0 | Status: SHIPPED | OUTPATIENT
Start: 2025-08-20 | End: 2025-08-21

## 2025-08-21 ENCOUNTER — ANESTHESIA EVENT (OUTPATIENT)
Dept: GASTROENTEROLOGY | Facility: EXTERNAL LOCATION | Age: 55
End: 2025-08-21

## 2025-08-27 ENCOUNTER — TELEPHONE (OUTPATIENT)
Dept: GASTROENTEROLOGY | Facility: CLINIC | Age: 55
End: 2025-08-27
Payer: COMMERCIAL

## 2025-09-03 ENCOUNTER — ANESTHESIA (OUTPATIENT)
Dept: GASTROENTEROLOGY | Facility: EXTERNAL LOCATION | Age: 55
End: 2025-09-03

## 2025-09-03 ENCOUNTER — APPOINTMENT (OUTPATIENT)
Dept: GASTROENTEROLOGY | Facility: EXTERNAL LOCATION | Age: 55
End: 2025-09-03
Payer: COMMERCIAL

## 2025-09-03 PROBLEM — F41.9 ANXIETY: Status: ACTIVE | Noted: 2025-09-03

## 2025-09-03 PROBLEM — F32.A DEPRESSION: Status: ACTIVE | Noted: 2025-09-03

## 2025-09-03 RX ORDER — PROPOFOL 10 MG/ML
INJECTION, EMULSION INTRAVENOUS AS NEEDED
Status: DISCONTINUED | OUTPATIENT
Start: 2025-09-03 | End: 2025-09-03

## 2025-09-03 RX ORDER — LIDOCAINE HYDROCHLORIDE 20 MG/ML
INJECTION, SOLUTION INFILTRATION; PERINEURAL AS NEEDED
Status: DISCONTINUED | OUTPATIENT
Start: 2025-09-03 | End: 2025-09-03

## 2025-09-03 RX ORDER — SODIUM CHLORIDE 9 MG/ML
INJECTION, SOLUTION INTRAVENOUS CONTINUOUS PRN
Status: DISCONTINUED | OUTPATIENT
Start: 2025-09-03 | End: 2025-09-03

## 2025-09-03 RX ADMIN — PROPOFOL 50 MG: 10 INJECTION, EMULSION INTRAVENOUS at 13:20

## 2025-09-03 RX ADMIN — PROPOFOL 50 MG: 10 INJECTION, EMULSION INTRAVENOUS at 13:26

## 2025-09-03 RX ADMIN — PROPOFOL 50 MG: 10 INJECTION, EMULSION INTRAVENOUS at 13:22

## 2025-09-03 RX ADMIN — PROPOFOL 50 MG: 10 INJECTION, EMULSION INTRAVENOUS at 13:24

## 2025-09-03 RX ADMIN — LIDOCAINE HYDROCHLORIDE 2.5 ML: 20 INJECTION, SOLUTION INFILTRATION; PERINEURAL at 13:07

## 2025-09-03 RX ADMIN — PROPOFOL 50 MG: 10 INJECTION, EMULSION INTRAVENOUS at 13:12

## 2025-09-03 RX ADMIN — PROPOFOL 50 MG: 10 INJECTION, EMULSION INTRAVENOUS at 13:16

## 2025-09-03 RX ADMIN — PROPOFOL 50 MG: 10 INJECTION, EMULSION INTRAVENOUS at 13:11

## 2025-09-03 RX ADMIN — PROPOFOL 50 MG: 10 INJECTION, EMULSION INTRAVENOUS at 13:29

## 2025-09-03 RX ADMIN — PROPOFOL 50 MG: 10 INJECTION, EMULSION INTRAVENOUS at 13:33

## 2025-09-03 RX ADMIN — SODIUM CHLORIDE: 9 INJECTION, SOLUTION INTRAVENOUS at 13:07

## 2025-09-03 RX ADMIN — PROPOFOL 50 MG: 10 INJECTION, EMULSION INTRAVENOUS at 13:14

## 2025-09-03 RX ADMIN — PROPOFOL 50 MG: 10 INJECTION, EMULSION INTRAVENOUS at 13:18

## 2025-09-03 RX ADMIN — PROPOFOL 100 MG: 10 INJECTION, EMULSION INTRAVENOUS at 13:07

## 2025-09-03 RX ADMIN — PROPOFOL 50 MG: 10 INJECTION, EMULSION INTRAVENOUS at 13:09

## 2025-09-03 SDOH — HEALTH STABILITY: MENTAL HEALTH: CURRENT SMOKER: 1

## 2025-09-03 ASSESSMENT — PAIN SCALES - GENERAL
PAINLEVEL_OUTOF10: 0 - NO PAIN
PAINLEVEL_OUTOF10: 0 - NO PAIN
PAIN_LEVEL: 0
PAINLEVEL_OUTOF10: 3
PAINLEVEL_OUTOF10: 0 - NO PAIN

## 2025-09-03 ASSESSMENT — PAIN - FUNCTIONAL ASSESSMENT
PAIN_FUNCTIONAL_ASSESSMENT: 0-10

## 2025-09-08 ENCOUNTER — APPOINTMENT (OUTPATIENT)
Dept: RADIOLOGY | Facility: HOSPITAL | Age: 55
End: 2025-09-08
Payer: COMMERCIAL

## 2025-09-22 ENCOUNTER — APPOINTMENT (OUTPATIENT)
Dept: RADIOLOGY | Facility: HOSPITAL | Age: 55
End: 2025-09-22
Payer: COMMERCIAL